# Patient Record
Sex: MALE | Race: WHITE | URBAN - METROPOLITAN AREA
[De-identification: names, ages, dates, MRNs, and addresses within clinical notes are randomized per-mention and may not be internally consistent; named-entity substitution may affect disease eponyms.]

---

## 2019-04-18 ENCOUNTER — TELEPHONE (OUTPATIENT)
Dept: OTHER | Facility: OUTPATIENT CENTER | Age: 24
End: 2019-04-18

## 2019-04-18 NOTE — TELEPHONE ENCOUNTER
.Per: NALINI beltran/ [employer]  Copay$ 0   Ded$ 0; Met$ 0   Coins 0%    Out of Pocket Max$ 0 ; Met$ 0     Psych testing require auth (26093/46583)? yes  Extended therapy require auth (97162)?  yes    Exclusions:   Family Therapy (03292/17149)  NO    Marriage couple counseling  YES   Transgender/Gender Dysphoria no   CSB no   Sexual dysfunction yes    Patient Contacted about benefits:  LEFT VOICEMAIL  Date contacted: 04/18/2019

## 2019-04-18 NOTE — TELEPHONE ENCOUNTER
[facility] Telephone Intake    Date:  2019  Client Name:  Yousif Allen  Preferred Name:    MRN:  [id number]   :  1995       Age:  24 year old     Presenting Problem / Reason for Assessment   (Clinical History &Symptoms): Gender Dysphoria, Letter of Support     Suggested Program: TG    Seen Other Providers (if so, where):  M.D. : [facility] Layla Sandoval   Therapist: none since   Psychiatrist:       Medications:  Testosterone Gel     Prescribing Physician: Layla Sandoval  (moved from [place] 2018)    Diagnosis (if known): Gender Dysphoria     Referral Source: Online       Follow Up:    Insurance Benefits to be evaluated.  Note will be entered when validated.    Patient wishes to be contacted regarding Insurance benefits: Yes     Please Verify Registration    Please send Welcome Packet and document date sent.

## 2019-04-19 ENCOUNTER — OFFICE VISIT (OUTPATIENT)
Dept: OTHER | Facility: OUTPATIENT CENTER | Age: 24
End: 2019-04-19
Payer: COMMERCIAL

## 2019-04-19 DIAGNOSIS — F64.0 GENDER DYSPHORIA IN ADOLESCENT AND ADULT: Primary | ICD-10-CM

## 2019-04-19 NOTE — PROGRESS NOTES
"[facility]  Program in Human Sexuality  Department of Family Medicine & Community Health  [facility]   [address]               [place] [address]  Phone: [phone]  Fax: [phone]  [email or web address]    Transgender Diagnostic (TG) Diagnostic Assessment Interview      Date of Service: 4/19/19  Client Name: Yousif Allen  YOB: 1995  24 year old  MRN:  [id number]  Gender/Gender Identity: male  He/him  Treating Provider: Wilfredo Verdugo PsyD   Program: TG  Type of Session: Assessment  Present in Session: Client   Number of Minutes:  55                      Provided introductions including this writer's status as a post-doctoral fellow who is supervised by licensed psychologist. Discussed confidentiality and diagnostic assessment process. Gave Client instructions to complete Redcap questionairres between first and second session        Sources of Information: All information in this assessment was gathered from interview with Client and initial paperwork.      Identifying Information: Client is an 24 year old  individual assigned female at birth who identifies as male. He denied any other relevant cultural factors.    Referral Source: Online search.     Chief Complaint/Presenting Problem and Goals: Letter for metoidioplasty with Dr. Kaveh Odonnell and [facility] in [place], [place].  Surgery is scheduled for November.       History of gender dysphoria: Client shared that he has always felt male. Growing up he spent time with his brother and engaged in masculine interests. Friends in elementary school would be confused and refer to him as a boy, which he found he liked. Around this time Client received feedback from parents that he needed to act more feminine which he had difficulty doing. Around puberty Client started to become depressed related to secondary sex characteristic development and it being \"harder\" for others " "to view and treat him as a young boy. G[place] up in the [facility], Client knew about transgender people. He began searching more about this on the internet at age 16. He began exploring with name and pronouns in 9th grade. From the period of 9th and 10th grade Client came out to parents and friends. Client was limited in his ability to transition at school since it was a [employer] school. The summer after 10th grade, Client transitioned full time including legal name change, HRT, and shortly after chest surgery. Client started out 11th grade at a public school and went stealth.       Body Image/Anatomical dysphoria: Client shared that he feels \"better\" about his body but that it is \"not fully congruent.\"  Client shared that puberty was \"unpleasant\" and he particularly did not like how his breast  to people gendering him as female more often.     Client started testosterone at the age of 18 which he has responded favorably to.  Client underwent masculinizing chest surgery at the age of 19 with Dr. Linn, which he responded favorably to.     Client continues to experience dysphoria related to his genitals. He has not been sexually active in over a year. Client avoids any forms of genital stimulation or self-pleasure due to his dysphoria. Client shared that he first began discussing metoidioplasty with his medical provider in 2016.     Social Supports: Client identified his support system as family, friends, and coworkers. Client has more limited social support while contracted to work in [place], but feels this is adequate.     Client shared that he did not have a direct conversation about gender with family. He began socially transitioning and parent's asked what was going on. They initially hoped it was a phase but after some time understood it was not. Client shared that his family has been accepting. Client shared that most of his friends do not know him prior to his transition and he mostly goes " "stealth. Client denied having any friends or acquaintances who are trans or gender diverse.    Internalized transphobia: Client denied having any negative experiences due to his gender. He reported not having much interest or feeling connected to the transgender community. Client shared that he feels \"confident\" in his identity.     Relevant Sexuality Information: Client identifies as straight. He shared that his gender identity makes him more hesitant to seek out relationships. He has been in one relationship for 2.5 years with a cisgender woman. Client denied any sexual functioning concerns related to desire, arousal, orgasm, or pain.    Mental Health History: Client denied any history of hospitalizations for mental health, suicidal ideation, or self injurious behavior.     Client attended psychotherapy in 2013 for \"a month or two\" with Sandy Temple. They worked on getting approval for Client to be prescribed HRT.     Client does not take any medication for mental health concerns.    He denied having any family history of mental health concerns.       Substance Use: Client denied using any nicotine products. Client shared that he no longer drinks, because \"I don't really like to be impaired at all.\" When he was drinking he reported being an occasional drinker and quantified this as \"once every few months.\" Client denied using another substances.     Client received a CAGE score of 1, he endorsed:  Feeling bad or guilty about my substance use    Client denied having any family history of substance use concerns.     Medical History: Client receives primary medical care from Layla Sandoval [facility] in [place], [place]. He denied having any current medical conditions. He is prescribed 1.62% testosterone gel. Client has had his wisdom teeth removed and undergone masculinizing chest surgery. He denied any other significant illnesses, injuries, or hosptializations.       Family History: Client was born and raised in [facility], [place] to an intact family consisting of his mother, father, and older brother (by two years). He reported having a positive upbringing. Client's father is 59 and a , who is recently retired. His mother is 59 and a tax prepare. Client reported that his relationship with both was strong growing up and he currently talks to them weekly. Client's brother is 24, he reported that the two of them spent a lot of time together growing up. The would have brotherly fights, but it was a positive relationship. The two of them currently have a more limited relationship because his brother is \"quiet.\"       Abuse History: Client denied experiencing any emotional/verbal, physical, or sexual abuse. He denied engaging in any abusive behaviors.       Current Significant Relationship/Partner: Client is not currently in a relationship. Client was in a relationship with a cisgender woman from [id number]. She ended the relationship and Client is unsure of why. Client shared that he has been hesitant to pursue other intimate relationships due to his gender dysphoria.       Educational History: Client attended a mixed parochial school from k-8. From 9th -10th grade he attended an all girls parochial school which heightened his dysphoria. He transferred to public school to finish high school.     Client graduated from [employer] in 2018. He studied english and education with a focus on teaching and literacy.     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 He finishes his contract in August. Client has applied to another [employer] position in [place], which would start in the fall of 2019.     Legal Issues: None reported.       CONCLUSIONS    Strengths and Liabilities: Client identified his strengths as \"patience for people and ability to teach/help, work ethic and organization.\" He identified his areas of " "growth to be \"too shy and quiet at times, not always confident.\"      Symptoms:Client denied making any plans to end his life in the past two weeks. He denied having thoughts that he would be better off dead or hurting himself in some way in the past two weeks.      PHQ-9:  PHQ-9 ([employer])        No Interest In Doing Things   0   Feeling Depressed   0   Trouble Sleeping   1    Tired / No Energy   1     No appetite or Over-Eating   0   Feeling Bad about Self   0     Trouble Concentrating   0     Moving Slow or Restless   0     Suicidal Thoughts   0     Total Score   2       PHQ-9 SCORING CARE FOR SEVERITY  Interpretation of Total Score  Total Score   Depression Severity and Recommendations     0-9   No Major Depression     10-14   Moderate. Initial weekly follow up. If patient is responding, monthly contacts. Meds or therapy.     15-19   Moderate severe. Initial weekly follow up. If patient is responding, 2-4 week contacts. Meds and/or therapy.     >20   Severe. Weekly contact. Meds and therapy.             DEJA-7:  DEJA-7 ([employer])        Feeling nervous, anxious, or on edge  1   Not being able to stop or control worrying 0   Worrying too much about different things 1   Trouble relaxing 0   Being so restless that it is hard to sit still 0   Becoming easily annoyed or irritable 0   Feeling afraid, as if something awful might happen 0   Total Score   22      DEJA-7 SCORING FOR SEVERITY  Interpretation of Total Score  Total Score   Anxiety Severity    0 - 5  Minimal anxiety   6 - 10  Mild anxiety   11 - 15 Moderate anxiety   16 - 21 Severe anxiety     Client endorsed the following as concerns for him in the past six months:     Emotional  Anxiety/worry  Nervous, shaky     Self-image and Coping  Shy or sensitive     Physical Functioning  none     Mental Functioning  none     Sexual Functioning  Gender concerns  Genital image/size concerns     Relationship and Life  Isolation, loneliness  Living more effectively      Mental " "Status   Appearance:  Casually dressed and Well groomed  Behavior/relationship to examiner/demeanor:  Cooperative, Engaged and Pleasant  Orientation: Oriented to person, place, time and situation  Speech Rate:  Normal  Speech Spontaneity:  Normal  Mood:  \"good\"  Affect:  Appropriate/mood-congruent  Thought Process (Associations):  Logical, Linear and Goal directed  Thought Content:  no evidence of suicidal or homicidal ideation, no overt psychosis and concrete  Abnormal Perception:  None  Attention/Concentration:  Normal  Language:  Intact  Insight:  Adequate  Judgment:  Good        DSM-5 Diagnosis:  Diagnoses       Codes Comments    Gender dysphoria in adolescent and adult    -  Primary F64.0             Interactive Complexity: none.    Conclusions/Recommendations/Initial Treatment Goals: The client is a 24 year old American person assigned male at birth who identifies as male. Based on the client's current report of symptoms, client meets criteria for gender dysphoria. The client's gender concerns affect client's ability to function and have been causing clinically significant distress. The client reports no current drug/alcohol use. The patient is also struggling with relocating to [place]. Client denies any current safety concerns. Based on the client's reported symptoms and impact on functioning, the plan for the patient is:  1. Start supportive individual with a provider specialized in gender. Therapy should focus on:  a. Aiding Client in having a reality based understanding of metoidioplasty   b. Aiding Client in developing plans to prepare, undergo, and recover from metoidoplasty  c. Writing a letter of support for metoidoplasty  2. Consider ways of increasing client's social support through gender or special interest groups  3. Continue to assess the impact of client's family and relational patterns on their current well-being.   4. Coordinate care as needed with medical, psychological, and family " providers specifically surgeon for metoidioplasty.   5. Psychological testing to further assess dx, psychological functioning, and treatment direction. Consider administering the following psychological tests: Redcap measures on gender and well-being.       Wilfredo Verdugo PsyD  Postdoctoral Fellow

## 2019-04-23 ASSESSMENT — ANXIETY QUESTIONNAIRES
2. NOT BEING ABLE TO STOP OR CONTROL WORRYING: NOT AT ALL
7. FEELING AFRAID AS IF SOMETHING AWFUL MIGHT HAPPEN: NOT AT ALL
1. FEELING NERVOUS, ANXIOUS, OR ON EDGE: SEVERAL DAYS
GAD7 TOTAL SCORE: 2
6. BECOMING EASILY ANNOYED OR IRRITABLE: NOT AT ALL
5. BEING SO RESTLESS THAT IT IS HARD TO SIT STILL: NOT AT ALL
3. WORRYING TOO MUCH ABOUT DIFFERENT THINGS: SEVERAL DAYS

## 2019-04-23 ASSESSMENT — PATIENT HEALTH QUESTIONNAIRE - PHQ9
5. POOR APPETITE OR OVEREATING: NOT AT ALL
SUM OF ALL RESPONSES TO PHQ QUESTIONS 1-9: 2

## 2019-04-24 ASSESSMENT — ANXIETY QUESTIONNAIRES: GAD7 TOTAL SCORE: 2

## 2019-05-03 ENCOUNTER — HEALTH MAINTENANCE LETTER (OUTPATIENT)
Age: 24
End: 2019-05-03

## 2019-05-06 NOTE — PROGRESS NOTES
I did not personally see the patient.  I reviewed and agree with the assessment and plan as documented in this note. Tanya Obando, PhD, LP

## 2019-05-09 ENCOUNTER — OFFICE VISIT (OUTPATIENT)
Dept: OTHER | Facility: OUTPATIENT CENTER | Age: 24
End: 2019-05-09
Payer: COMMERCIAL

## 2019-05-09 DIAGNOSIS — F64.0 GENDER DYSPHORIA IN ADOLESCENT AND ADULT: Primary | ICD-10-CM

## 2019-05-09 NOTE — PROGRESS NOTES
[facility] -  Case Progress Note    Date of Service: 5/09/19   Client Name: Yousif Allen  YOB: 1995  MRN:  [id number]  Treating Provider: Wilfredo Verdugo PsyD  Type of Session: Individual  Present in Session: Client   Number of Minutes:  38    Current Symptoms/Status:  Dypshoria    Discomfort over assigned body, e.g., primary and secondary sex characteristics    Desire for masculine primary and secondary sex characteristics     Identifies as male    Affirms gender as male, e.g., through preferred name/pronouns (He/Him)      Progress Toward Treatment Goals:   Created treatment plan  Went through requirements for letters  Discussed Client's understanding of metiodioplasty    Intervention: Modality and Description: Used integrative psychotherapy techniques incorporating CBT, emotion-focused, and psychodynamic theories.    Met with Client individually. Checked in about time elapsed between sessions. Collaboratively developed treatment plan. Discussed filling out a release for records for Client's upcoming appointment with Emanuel Tapia for a secondary letter.     Explored Client's understanding of metiodioplasty. Client shared about his experiencing going to a consult for phalloplasty and deciding on metodioplasty due to the invasiveness and complication rates with phalloplasty. Client was able to describe the metiodioplasty and recovery process rationally. Explored with Client his plans for getting to/from surgery and recovery plans with work.     Went though information from surgeon. This writer discussed requirement that surgeon detailed letters must be from two licensed providers, not from an intern with an undersigned licensed provider. Discussed plan to call and explore if letter from this writer as a post-doc at the program in human sexuality would fit requirements or not. Discussed plans to either schedule letter writing session with this provider if okay' ed or refer to a licensed  clinician within this clinic.     Response to Intervention:  Client was open and responsive.     Assignment:  Complete consent to disclose written information to Emanuel Tapia.  Attend session with Emanuel Tapia for letter.   Call surgeon's office to verify if this writer's status as post-doc covers their requirements. Call to let know.     Interactive Complexity: None    Diagnosis:   Gender dysphoria In adolescents or adults  -  302.85    Plan / Need for Future Services:  Return for therapy as needed. Draft surgery letter or refer to licensed provider in clinic    Wilfredo Verdugo PsyD  Postdoctoral Fellow

## 2019-07-02 ENCOUNTER — MEDICAL CORRESPONDENCE (OUTPATIENT)
Dept: HEALTH INFORMATION MANAGEMENT | Facility: CLINIC | Age: 24
End: 2019-07-02

## 2019-07-02 ENCOUNTER — OFFICE VISIT (OUTPATIENT)
Dept: OTHER | Facility: OUTPATIENT CENTER | Age: 24
End: 2019-07-02
Payer: COMMERCIAL

## 2019-07-02 DIAGNOSIS — F64.0 GENDER DYSPHORIA IN ADOLESCENT AND ADULT: Primary | ICD-10-CM

## 2019-07-02 NOTE — PROGRESS NOTES
[facility] -  Case Progress Note    Date of Service: 7/02/19   Client Name: Yousif Allen  YOB: 1995  MRN:  [id number]  Treating Provider: Wilfredo Verdugo PsyD LP  Type of Session: Individual  Present in Session: Client   Number of Minutes:  45    Current Symptoms/Status:  Dypshoria    Discomfort over assigned body, e.g., primary and secondary sex characteristics    Desire for masculine primary and secondary sex characteristics     Identifies as male    Affirms gender as male, e.g., through preferred name/pronouns (He/Him)      Progress Toward Treatment Goals:   Drafted LOS    Intervention: Modality and Description: Used integrative psychotherapy techniques incorporating CBT, emotion-focused, and psychodynamic theories.    Met with Client individually. Checked in about time elapsed between sessions. He completed other letter with Emanuel Tapia. Collaboratively drafted LOS for lower surgery (metiodioplasty and hysterectomy) using WPATH SOC and additional criteria from surgeon (see letter in file). Discussed plans with completing letter and sending copy to surgeon and Client. Welcomed Client to contact for any support during or after surgery.     Response to Intervention:  Client was open and responsive.     Assignment:  Attend surgery    Interactive Complexity: None    Diagnosis:   Gender dysphoria In adolescents or adults  -  302.85    Plan / Need for Future Services:  None.    Wilfredo Verdugo PsyD, LP  Clinical Psychologist  Postdoctoral Fellow

## 2019-07-02 NOTE — LETTER
2019    Adrian Linn M.D.  [address]  [place] [address]    Re: Yousif Allen  - 1995    Letter of support for gender affirmation surgery (Lower surgery: metoidioplasty and hysterectomy)    Dr. Linn    I am writing this letter on behalf of Yousif Alejandro. I am a post-doctoral fellow at the [facility] Program in Human Sexuality, where I am receiving training as a gender specialist.Through the course of my fellowship, I have provided treatment and assessment to a wide range of gender diverse individual, couples, and families across the life span. Yousif Allen has received services through the Program in Human Sexuality s Transgender Health Program since 21. Yousif Allen is a 24 year old transgender person who initially sought services to making decisions about lower surgery (metoidioplasty and hysterectomy) to address his gender dysphoria. His most recent appointment for individual psychotherapy occurred on 19.    Yousif Allen completed a diagnostic session, as well as two psychotherapy appointments. Additionally, he completed a thorough psychological evaluation consisting of  a set of evaluative measures of gender dysphoria, psychosocial adjustment, sexual health, and overall psychological functioning.     Yousif Allen has been diagnosed with gender dysphoria. He does not meet criteria for any additional mental health or substance use diagnoses at this time. He has exhibited a persistent and long-standing gender and anatomic dysphoria related to his birth-assigned sex. He came out and began transitioning around the age of 17. Around that time he experienced some anxiety and depression for periods of time in adjustment to his process of disclosing his identity to his family and friends, but does not meet criteria for any anxiety or mood disorders at this time. He is well adjusted and thoughtful, and has approached his decision making for surgery  with intention and care. Yousif Allen s mental health concerns are stable and well controlled, and do not pose any barrier to the ability to consent and undergo gender-affirming lower surgery at this time.    Yousif Allen currently meets the World Professional Association for Transgender Health (WPATH) Standards of Care for surgical gender reassignment of gender dysphoric persons, as well as internal prerequisites for gender-affirming lower surgery. Surgery is considered a medically necessary treatment by the WPATH Standards of Care for transgender persons. On behalf of our staff, I support his decision to pursue medically necessary surgery at this time.     Yousif Allen has been on masculinizing hormones since September of 2013. He has undergone significant and permanent changes such as legal name change and socially transitioning to live full-time in his affirmed gender since September of 2013. He has been living continuously in his gender role for six years. He underwent masculinizing chest surgery in late 2014/early 2015. Yousif Allen continues to experience anatomic dysphoria related to his genitals. Lower surgery will decrease his gender dysphoria and psychological distress and improve his overall psychological functioning.  In some cases, denial of access to this surgery can lead to increased isolation, decreased work performance, and decreased sexual and interpersonal functioning. Surgery is expected to increase comfort with self, and improve interpersonal, sexual, and vocational functioning.    Yousif Allen has educated themselves about the surgery, post-operative expectations, and is aware of its risks and benefits. He has expressed an understanding and ability to comply with post-surgery follow up requirements. Given his concerns and the availability of this type of surgery, I support his choice of surgeon.    The Transgender Health Services staff support is based on the psychological indication for  surgery and did not include a physical examination to assess medical contra-indications for the surgical procedure. This letter of support is valid for one year from the date of approval.      If you have any questions, or are in need of additional information, please do not hesitate to contact me.      Sincerely,      Wilfredo Verdugo PsyD, LP  Clinical Psychologist  Postdoctoral Fellow            Soo Obando, PhD, LP, CST    Coordinator, Transgender Health Services  Supervisor

## 2020-03-11 ENCOUNTER — HEALTH MAINTENANCE LETTER (OUTPATIENT)
Age: 25
End: 2020-03-11

## 2021-01-03 ENCOUNTER — HEALTH MAINTENANCE LETTER (OUTPATIENT)
Age: 26
End: 2021-01-03

## 2021-04-25 ENCOUNTER — HEALTH MAINTENANCE LETTER (OUTPATIENT)
Age: 26
End: 2021-04-25

## 2021-10-10 ENCOUNTER — HEALTH MAINTENANCE LETTER (OUTPATIENT)
Age: 26
End: 2021-10-10

## 2022-05-21 ENCOUNTER — HEALTH MAINTENANCE LETTER (OUTPATIENT)
Age: 27
End: 2022-05-21

## 2022-09-18 ENCOUNTER — HEALTH MAINTENANCE LETTER (OUTPATIENT)
Age: 27
End: 2022-09-18

## 2023-06-04 ENCOUNTER — HEALTH MAINTENANCE LETTER (OUTPATIENT)
Age: 28
End: 2023-06-04